# Patient Record
Sex: FEMALE
[De-identification: names, ages, dates, MRNs, and addresses within clinical notes are randomized per-mention and may not be internally consistent; named-entity substitution may affect disease eponyms.]

---

## 2023-02-07 PROBLEM — Z00.00 ENCOUNTER FOR PREVENTIVE HEALTH EXAMINATION: Status: ACTIVE | Noted: 2023-02-07

## 2023-02-10 ENCOUNTER — APPOINTMENT (OUTPATIENT)
Dept: ENDOCRINOLOGY | Facility: CLINIC | Age: 83
End: 2023-02-10
Payer: MEDICARE

## 2023-02-10 VITALS
HEART RATE: 69 BPM | HEIGHT: 64.5 IN | SYSTOLIC BLOOD PRESSURE: 126 MMHG | WEIGHT: 151 LBS | DIASTOLIC BLOOD PRESSURE: 66 MMHG | BODY MASS INDEX: 25.46 KG/M2

## 2023-02-10 PROCEDURE — 99203 OFFICE O/P NEW LOW 30 MIN: CPT

## 2023-02-24 NOTE — ASSESSMENT
[FreeTextEntry1] : 1. Osteoporosis\par JAMSHID has been diagnosed with osteoporosis for 7years \par Osteoporosis treatment history includes Zoledronic acid infusions x 4-5 infusions, last dose in 2020 (before pandemic)\par Most recent DXA, per patient within the past year.\par No past DXA for review today.\par Endorses recent labs ordered with PCP, medical record release form completed and sent to PCP for DXAs and recent labs\par Discussed universal care of adequate calcium/vitD intake, weight bearing exercises and fall prevention\par Patient endorses recommendation to resume therapy, specifically noting prolia, which given duration of IV bisphosphonate, if continued need for fracture risk reduction and improvement of BMD needed, would be an ideal therapy\par Counseling provided regarding osteoporosis, with focus on post-menopausal osteoporosis etiology, risk factors, evaluation and management with both calcium/vitD, lifestyle and medicinal (bisphosphonate, oral/IV vs prolia vs anabolic agent (teriparatide, abaloparatide, romosozumab).\par Patient prefers to precede with Prolia, pending review of DXA and lab results and pending authorization\par \par I will call patient to review results once obtained from PCP\par \par Priya Tavarez MS, FNP-BC, [facility]\par 02/10/2023\par \par

## 2023-02-24 NOTE — PHYSICAL EXAM
[Alert] : alert [No Acute Distress] : no acute distress [Normal Voice/Communication] : normal voice communication [Normal Hearing] : hearing was normal [No Respiratory Distress] : no respiratory distress [Normal Rate and Effort] : normal respiratory rate and effort [Clear to Auscultation] : lungs were clear to auscultation bilaterally [Normal S1, S2] : normal S1 and S2 [Normal Rate] : heart rate was normal [Regular Rhythm] : with a regular rhythm [No Stigmata of Cushings Syndrome] : no stigmata of Cushings Syndrome [Normal Gait] : normal gait [Oriented x3] : oriented to person, place, and time [Normal Affect] : the affect was normal [Normal Insight/Judgement] : insight and judgment were intact [Normal Mood] : the mood was normal [Kyphosis] : no kyphosis present

## 2023-02-24 NOTE — HISTORY OF PRESENT ILLNESS
[FreeTextEntry1] : Ms. BOSE is a 82 year female with pmhx of Osteoporosis, arthritis (knee) who presents for osteoporosis evaluation.\par \par Patient of Dr. Trina Hawkins, PCP- P: [phone], F [phone]\par Endocrinologist, Dr. Alexander no longer accepting Medicare, wishes to transition care to our office\par \par Eleni has been diagnosed with osteoporosis for 7 years.  At that time, PCP recommended the left hip "needed attention" and initiated reclast at that time\par Past treatment: Zoledronic acid, s/p ~4-5 infusions, last infusion right before COVID in 2020\par Last bone density, within past year - no results for review for this or past DXA \par \par In terms of non-modifiable risk factors, ELENI reports that:\par - NO history of fracture as an adult\par - Is no history of fracture in a first degree relative\par - Is female\par - Is \par - Is not of poor health / frail\par - Does not have dementia\par - Is no history of anti-epileptic drug use\par - Is no history of malabsorption, celiac disease\par \par With respect to potentially modifiable risk factors, ELENI reports that:\par - They do not use tobacco\par - They do not have heavy alcohol use\par - They do not have a current/past history of glucocorticoid use\par - They drink 1-2 cups of caffeinated beverages daily\par \par Postmenopausal since 50s\par They did not experience early menopause (age < 45) or have a bilateral oopherectomy\par They did not have prolonged pre-menopausal amenorrhea ( > 1 year )\par \par Current calcium and vitamin D intake includes:\par Dietary sources: vegetable heavy diet, cheese daily\par Supplements: vitamin D 2000 IU daily\par \par

## 2023-02-24 NOTE — ADDENDUM
[FreeTextEntry1] : 2/23/23, addendum, SG\par VM left in attempt to review most recent DXA sent from PCP \par 2/23/2022 DXA\par L1-L2 T-score  -0.2, L1-L4 T-score 1.3\par Left femoral neck T-score -1.8, Left total hip -0.5\par Right femoral neck T-score -1.8, Right total hip -0.9\par Left forearm T-score -1.8\par 10 year total FRAX 15.1\par 10y hip FRAX 4.3%\par \par Treatment warranted given elevated FRAX.  H/o 3+ reclast infusions (last dosed before pandemic).  For this, would continue as planned with initiation of prolia.\par PA for prolia initiated via [facility]\par \par 2/24/23, addendum, SG\par Additional VM left returning patients call.\par \par Patient returned call and discussed plan.  Will have benefit verification ran.

## 2023-03-17 ENCOUNTER — APPOINTMENT (OUTPATIENT)
Dept: ENDOCRINOLOGY | Facility: CLINIC | Age: 83
End: 2023-03-17

## 2023-03-24 ENCOUNTER — APPOINTMENT (OUTPATIENT)
Dept: ENDOCRINOLOGY | Facility: CLINIC | Age: 83
End: 2023-03-24
Payer: MEDICARE

## 2023-03-24 PROCEDURE — 96372 THER/PROPH/DIAG INJ SC/IM: CPT

## 2023-03-24 NOTE — ASSESSMENT
[FreeTextEntry1] : 1. Osteoporosis\par PAtient of myself, presents for Prolia initiated\par No auth REQ through medical benefit (medicare part B)\par -- 60 mg denosumab (Prolia) NDC: 93130-305-59, SN: [id number], LOT [id number], exp 04/30/2025 injected subcutaneously into left upper arm without complication\par -- Patient aware that next injection is due in 6 months and will return for this injection\par \par Follow-up in 6 months for next Prolia dosing\par Plan for annual FU with repeat DXA in March 2024\par \par Priya Tavarez, MS, FNP-BC, [facility]\par 03/24/2023\par \par \par

## 2023-09-27 ENCOUNTER — APPOINTMENT (OUTPATIENT)
Dept: ENDOCRINOLOGY | Facility: CLINIC | Age: 83
End: 2023-09-27
Payer: MEDICARE

## 2023-09-27 PROCEDURE — 96372 THER/PROPH/DIAG INJ SC/IM: CPT

## 2023-09-27 RX ADMIN — DENOSUMAB 0 MG/ML: 60 INJECTION SUBCUTANEOUS at 00:00

## 2024-04-11 ENCOUNTER — APPOINTMENT (OUTPATIENT)
Dept: ENDOCRINOLOGY | Facility: CLINIC | Age: 84
End: 2024-04-11
Payer: MEDICARE

## 2024-04-11 VITALS — WEIGHT: 132 LBS | DIASTOLIC BLOOD PRESSURE: 73 MMHG | BODY MASS INDEX: 22.31 KG/M2 | SYSTOLIC BLOOD PRESSURE: 96 MMHG

## 2024-04-11 DIAGNOSIS — M81.0 AGE-RELATED OSTEOPOROSIS W/OUT CURRENT PATHOLOGICAL FRACTURE: ICD-10-CM

## 2024-04-11 PROCEDURE — 99213 OFFICE O/P EST LOW 20 MIN: CPT | Mod: 25

## 2024-04-11 PROCEDURE — 96372 THER/PROPH/DIAG INJ SC/IM: CPT

## 2024-04-11 RX ORDER — DENOSUMAB 60 MG/ML
60 INJECTION SUBCUTANEOUS
Qty: 60 | Refills: 0 | Status: COMPLETED | OUTPATIENT
Start: 2024-04-11

## 2024-04-11 RX ADMIN — DENOSUMAB 0 MG/ML: 60 INJECTION SUBCUTANEOUS at 00:00

## 2024-04-11 NOTE — HISTORY OF PRESENT ILLNESS
[FreeTextEntry1] : Ms. BOSE is a 83 year female with pmhx of Osteoporosis, arthritis (knee) who presents for osteoporosis follow-up.  Patient of Dr. Trina Hawkins, PCP- P: [phone], F [phone]  Last (initial) visit with myself-- 2/10/2023  Interval change: Presents for annual osteoporosis follow-up Endorses recent GI illness, improved Most recent DEXA from 2/23/23 (as below), reviewed with patient, no repeat DEXA since prolia initiation Continues regimen of Prolia 60mg Q6 months, which was initiated in [facility] 2023, last dosed, 9/27/23 Since last visit, denies fractures or falls Continues dietary calcium +vitD supplementation Continues to routinely follow dentist, s/p extraction in January 2024, possible implant, but unsure if she wants an implant    1. Osteoporosis Postmenopausal since 50s Eleni has been diagnosed with osteoporosis for 2018.  At that time, PCP recommended the left hip "needed attention" and initiated reclast at that time  Past treatment:  -- Zoledronic acid, s/p ~4-5 infusions, last infusion right before COVID in 2020 -- Prolia 60mg Q6 months, dosed in March 2023 and most recently in September 2023 (9/27/23), dose due today  Current calcium and vitamin D intake includes: Dietary sources: vegetable heavy diet, cheese daily Supplements: vitamin D 2000 IU daily  Osteoporosis risk factors include: Postmenopausal status,  race, prior fracture, falls, height loss, small thin bones, tobacco use, excessive alcohol, anorexia, family history, vitamin D deficiency, corticosteroid use, seizure medications, malabsorption, hyperparathyroidism, hyperthyroidism. NEGATIVE EXCEPT: postmenopausal,

## 2024-04-11 NOTE — ASSESSMENT
[FreeTextEntry1] : 1. Osteoporosis JAMSHID has been diagnosed with osteoporosis since 2018 in setting of high FRAX with BMD in osteopenic range Most recent DXA, which was performed prior to prolia initiation, 2/23/23 reveals continued osteopenia at all sites-- lumbar spine (T-score -1.8), left femoral neck (-1.8), right femoral neck (T-score -1.8), left forearm (-1.8) with elevated FRAX of 10y total 15.1% and 10 year hip of 4.3%. Osteoporosis treatment history includes Zoledronic acid infusions x 4-5 infusions, last dose in 2020 (before pandemic); denosumab 60mg Q6 months in March 2023, September 2023 and dosed today, 04/11/2024 Discussed universal care of adequate calcium/vitD intake, weight bearing exercises and fall prevention -- continue prolia 60mg Q6 months, dose administered today (NDC: 94643-606-11, SN:[id number], LOT [id number], exp 06/30/26 injected subcutaneously into left upper arm without complication. Patient aware that next injection is due in 6 months and will return for this injection -- interval DEXA, I will call with results -- continue calcium + vitD  Follow-up in 6 months for Prolia Follow-up annually   Priya Tavarez MS, FNP-BC, [facility] 04/11/2024

## 2024-04-11 NOTE — DATA REVIEWED
[FreeTextEntry1] : Labs: 11/29/2023 gluc 104, Creat 1.00, GFR 56, calcium 10 A1C 5.8% TSH 3.22, TT4 8.4 Chol 114, HDL 50, LDL 50, Tg 59 CBC with WBC 8.7, RBC 3.49, H&H 10.7/33.1  DEXA Trend:  2/23/2022 DXA L1-L2 T-score -0.2, L1-L4 T-score 1.3 Left femoral neck T-score -1.8, Left total hip -0.5 Right femoral neck T-score -1.8, Right total hip -0.9 Left forearm T-score -1.8 10 year total FRAX 15.1 10y hip FRAX 4.3%  08-22 -2019, EXAM:  DX DEXA AXIAL HISTORY:  Evaluation of bone density.  Female;  Postmenopausal. TECHNIQUE:  The study was performed on a HoloKirondo densitometer.   FINDINGS:   AP Lumbar spine L1-L3  T-score: 0.0 . Z-score: 2.5 . Left femoral neck T-score: -2.0 . Z-score: 0.2 . Left total hip T-score: -0.8 . Z-score: 1.2 .    No gross hip structural abnormality.  There are degenerative sclerotic endplate changes in lumbar spine at L4-5.       COMPARISON: Comparison is made to prior DXA examination from 8/15/2018.  Prior studies dating back to 1/24/2014 are available for review. Lumbar spine: Statistically significant increase (2.8%) in bone mineral density.  Total left hip: No statistically significant change in bone mineral density.  FRACTURE RISK: In this patient, the FRAX ten year probability of fracture is: Major osteoporotic (humerus, forearm, spine, or hip): 15% Hip fracture: 4.2%  The 10 year risk estimate in this patient was calculated from the patient's reported age, sex, weight, height, ethnicity, and femoral neck BMD. Based on a questionnaire that the patient completed, no additional risk factors were included in the 10 year risk estimate.       08-, EXAM:  DX DEXA AXIAL HISTORY:  Evaluate bone density. Postmenopausal 77 years Female with clinical risk factors for osteoporotic fracture. TECHNIQUE: The study was done on a Hologic densitometer.  FINDINGS:   AP Lumbar spine L1-L3 T-score -0.3. Z-score 2.2. Left femoral neck T-score -2.0. Z-score 0.2. Left total hip T-score -0.8. Z-score 1.1.  In this patient, the FRAX 10-year probability of fracture is: Major osteoporotic fracture: 15% Hip fracture: 3.9%  The 10 year risk estimate in this patient was calculated from the patient's reported age, sex, weight, height, ethnicity, and femoral neck BMD. Based on a questionnaire that the patient completed, no additional clinical risk factors were included in the 10 year risk estimate.  COMPARISON: Comparison is made to prior DXA examination from 6/30/2016.  Lumbar spine: Nonsignificant +0.4% change in bone mineral density as dissimilar scan type or analysis method was utilized. Total left hip: Nonsignificant -1.4% change in bone mineral density as dissimilar scan type or analysis method was utilized.  IMPRESSION:  According to the WHO classification, the patient has low bone mass (osteopenia).  01-, EXAM:  DXA BONE DENSITY SCAN AXIAL  HISTORY:  73-year-old female being evaluated for a bone density assessment. MEDICATIONS:  Lipitor  TECHNIQUE:  Examination performed on a Hologic bone densitometry unit.  COMPARISON:  No prior studies are available for comparison. FINDINGS:  The average bone mineral density of the lumbar spine (L1-L4) is 0.974 g/sq cm, having a T-score of -0.7 The average bone mineral density of the left hip (total) is 0.821 g/sq cm, having a T-score of -1.0 The bone mineral density for the femoral neck is 0.599 g/sq cm, having a T-score of -2.2 IMPRESSION:  The bone mineral density of the lumbar spine is normal. The bone mineral density of the left hip is osteopenic.   COMMENTS:   Diagnosis is based on the lowest T-score of the PA spine, femoral neck, total hip or 33% radius according to the International Society of Clinical Densitometry (ISCD). For optimal accuracy, it is best to do future scans on the same DXA unit at the same office. WHO classification:  Normal T-score -1.0 or above; osteoporosis T-score -2.5 or below; low bone mass (osteopenia) T-score between -1.0 and -2.5.  Postmenopausal women and men 50+ use the WHO classification. Thank you for the opportunity to participate in the care of this patient.

## 2024-04-11 NOTE — PHYSICAL EXAM
[Alert] : alert [No Acute Distress] : no acute distress [Normal Voice/Communication] : normal voice communication [Normal Hearing] : hearing was normal [No Respiratory Distress] : no respiratory distress [Normal Rate and Effort] : normal respiratory rate and effort [Kyphosis] : no kyphosis present [No Stigmata of Cushings Syndrome] : no stigmata of Cushings Syndrome [Normal Gait] : normal gait [Oriented x3] : oriented to person, place, and time [Normal Affect] : the affect was normal [Normal Insight/Judgement] : insight and judgment were intact [Normal Mood] : the mood was normal

## 2024-05-14 ENCOUNTER — APPOINTMENT (OUTPATIENT)
Dept: RADIOLOGY | Facility: CLINIC | Age: 84
End: 2024-05-14

## 2024-10-10 ENCOUNTER — APPOINTMENT (OUTPATIENT)
Dept: ENDOCRINOLOGY | Facility: CLINIC | Age: 84
End: 2024-10-10

## 2024-10-10 PROCEDURE — 96372 THER/PROPH/DIAG INJ SC/IM: CPT

## 2025-04-10 ENCOUNTER — APPOINTMENT (OUTPATIENT)
Dept: ENDOCRINOLOGY | Facility: CLINIC | Age: 85
End: 2025-04-10
Payer: MEDICARE

## 2025-04-10 DIAGNOSIS — M81.0 AGE-RELATED OSTEOPOROSIS W/OUT CURRENT PATHOLOGICAL FRACTURE: ICD-10-CM

## 2025-04-10 PROCEDURE — G2211 COMPLEX E/M VISIT ADD ON: CPT | Mod: 2W

## 2025-04-10 PROCEDURE — 99213 OFFICE O/P EST LOW 20 MIN: CPT | Mod: 2W

## 2025-04-11 ENCOUNTER — APPOINTMENT (OUTPATIENT)
Dept: ENDOCRINOLOGY | Facility: CLINIC | Age: 85
End: 2025-04-11

## 2025-05-14 ENCOUNTER — OUTPATIENT (OUTPATIENT)
Dept: OUTPATIENT SERVICES | Facility: HOSPITAL | Age: 85
LOS: 1 days | End: 2025-05-14
Payer: SELF-PAY

## 2025-05-14 ENCOUNTER — APPOINTMENT (OUTPATIENT)
Dept: INFUSION THERAPY | Facility: CLINIC | Age: 85
End: 2025-05-14

## 2025-05-14 VITALS
WEIGHT: 145.06 LBS | SYSTOLIC BLOOD PRESSURE: 126 MMHG | HEIGHT: 64 IN | DIASTOLIC BLOOD PRESSURE: 74 MMHG | TEMPERATURE: 98 F | OXYGEN SATURATION: 100 % | RESPIRATION RATE: 18 BRPM | HEART RATE: 72 BPM

## 2025-05-14 RX ORDER — DENOSUMAB 60 MG/ML
60 INJECTION SUBCUTANEOUS ONCE
Refills: 0 | Status: COMPLETED | OUTPATIENT
Start: 2025-05-14 | End: 2025-05-14

## 2025-05-14 RX ADMIN — DENOSUMAB 60 MILLIGRAM(S): 60 INJECTION SUBCUTANEOUS at 17:24
